# Patient Record
Sex: MALE | URBAN - METROPOLITAN AREA
[De-identification: names, ages, dates, MRNs, and addresses within clinical notes are randomized per-mention and may not be internally consistent; named-entity substitution may affect disease eponyms.]

---

## 2024-07-01 ENCOUNTER — LAB (OUTPATIENT)
Dept: LAB | Facility: LAB | Age: 34
End: 2024-07-01
Payer: COMMERCIAL

## 2024-07-01 ENCOUNTER — CONSULT (OUTPATIENT)
Dept: ENDOCRINOLOGY | Facility: CLINIC | Age: 34
End: 2024-07-01
Payer: COMMERCIAL

## 2024-07-01 VITALS
HEIGHT: 70 IN | DIASTOLIC BLOOD PRESSURE: 82 MMHG | SYSTOLIC BLOOD PRESSURE: 126 MMHG | WEIGHT: 198.5 LBS | HEART RATE: 71 BPM | BODY MASS INDEX: 28.42 KG/M2

## 2024-07-01 DIAGNOSIS — Z11.3 ENCOUNTER FOR SCREENING EXAMINATION FOR SEXUALLY TRANSMITTED DISEASE: ICD-10-CM

## 2024-07-01 DIAGNOSIS — Z31.41 FERTILITY TESTING: Primary | ICD-10-CM

## 2024-07-01 DIAGNOSIS — Z13.71 SCREENING FOR GENETIC DISEASE CARRIER STATUS: ICD-10-CM

## 2024-07-01 LAB
HBV SURFACE AG SERPL QL IA: NONREACTIVE
HCV AB SER QL: NONREACTIVE
HIV 1+2 AB+HIV1 P24 AG SERPL QL IA: NONREACTIVE
TREPONEMA PALLIDUM IGG+IGM AB [PRESENCE] IN SERUM OR PLASMA BY IMMUNOASSAY: NONREACTIVE

## 2024-07-01 PROCEDURE — 87491 CHLMYD TRACH DNA AMP PROBE: CPT

## 2024-07-01 PROCEDURE — 99202 OFFICE O/P NEW SF 15 MIN: CPT

## 2024-07-01 PROCEDURE — 36415 COLL VENOUS BLD VENIPUNCTURE: CPT

## 2024-07-01 PROCEDURE — 86803 HEPATITIS C AB TEST: CPT

## 2024-07-01 PROCEDURE — 87340 HEPATITIS B SURFACE AG IA: CPT

## 2024-07-01 PROCEDURE — 86780 TREPONEMA PALLIDUM: CPT

## 2024-07-01 PROCEDURE — 99212 OFFICE O/P EST SF 10 MIN: CPT

## 2024-07-01 PROCEDURE — 87591 N.GONORRHOEAE DNA AMP PROB: CPT

## 2024-07-01 PROCEDURE — 87389 HIV-1 AG W/HIV-1&-2 AB AG IA: CPT

## 2024-07-01 RX ORDER — FLUOXETINE HYDROCHLORIDE 20 MG/1
20 CAPSULE ORAL EVERY MORNING
COMMUNITY
Start: 2024-04-15

## 2024-07-01 RX ORDER — BUPROPION HYDROCHLORIDE 150 MG/1
150 TABLET ORAL
COMMUNITY

## 2024-07-01 ASSESSMENT — COLUMBIA-SUICIDE SEVERITY RATING SCALE - C-SSRS
6. HAVE YOU EVER DONE ANYTHING, STARTED TO DO ANYTHING, OR PREPARED TO DO ANYTHING TO END YOUR LIFE?: NO
1. IN THE PAST MONTH, HAVE YOU WISHED YOU WERE DEAD OR WISHED YOU COULD GO TO SLEEP AND NOT WAKE UP?: NO
2. HAVE YOU ACTUALLY HAD ANY THOUGHTS OF KILLING YOURSELF?: NO

## 2024-07-01 ASSESSMENT — PATIENT HEALTH QUESTIONNAIRE - PHQ9
2. FEELING DOWN, DEPRESSED OR HOPELESS: NOT AT ALL
SUM OF ALL RESPONSES TO PHQ9 QUESTIONS 1 AND 2: 0
1. LITTLE INTEREST OR PLEASURE IN DOING THINGS: NOT AT ALL

## 2024-07-01 ASSESSMENT — PAIN SCALES - GENERAL: PAINLEVEL: 0-NO PAIN

## 2024-07-01 NOTE — PROGRESS NOTES
Visit Type: In Person    Patient is the partner of KIERSTEN Marcano 1991, who presents to [facility] care to order fertility testing and treatment, mildly elevated liver enzymes  and will have a follow up this summer with PCP    PARTNER HISTORY  Partner Name: Tk Trejo  Partner : [id number]  Partner email: [email or web address]  Occupation:  Underwriting Supervisor, [employer]  Prior fertility history:  Not known  PMH: mildly elevated liver enzymes, stopped drinking x 2 months and will follow up with PCP  PSH: [place] teeth removed,   Smoking: No  Alcohol Use: Yes  Drug Use: yes, marijuana/CBD gummies  Medications: Bupropion, Prozac  Injuries: No  STD: No  SA: No  SA Results:  NA    Plan:  STDS  [facility]  [facility]    Follow up Plan:  6 week virtual visit to review test results and treatment options    Jeni Kern CNP 24 4:20 PM

## 2024-07-02 LAB
C TRACH RRNA SPEC QL NAA+PROBE: NEGATIVE
N GONORRHOEA DNA SPEC QL PROBE+SIG AMP: NEGATIVE

## 2024-07-22 ENCOUNTER — DOCUMENTATION (OUTPATIENT)
Dept: ENDOCRINOLOGY | Facility: CLINIC | Age: 34
End: 2024-07-22
Payer: COMMERCIAL

## 2024-07-22 NOTE — PROGRESS NOTES
Message to patient regarding [facility] genetic screening:    Genetic carrier testing reviewed:    [   ] Genetic carrier testing reviewed and POSITIVE result noted, indicating that the patient a carrier of one or more genetic conditions.     [  X ] Genetic carrier testing reviewed and NEGATIVE result noted.    Additional actions:  [   ] Ok to proceed with next steps, no additional genetic testing or counseling recommended  [   ] Awaiting partner testing  [  X ] Partner testing reviewed and no concordance. Ok to proceed with planned treatments. Partner Flores Shepard  1991 is a carrier for Metachromatic Leukodystrophy, patient is negative for this genetic condition.     Jeni Kern CNP 24 11:54 AM

## 2024-07-23 LAB — SCAN RESULT: NORMAL

## 2024-07-29 ENCOUNTER — ANCILLARY PROCEDURE (OUTPATIENT)
Dept: ENDOCRINOLOGY | Facility: CLINIC | Age: 34
End: 2024-07-29
Payer: COMMERCIAL

## 2024-07-29 DIAGNOSIS — Z31.41 FERTILITY TESTING: ICD-10-CM

## 2024-07-29 LAB
% EX RESIDUAL CYTOPLASM (SEMEN): 0 %
% HEAD DEFECTS (SEMEN): 96.8 %
% NECK MIDPIECE (SEMEN): 46.3 %
% NORMAL (SEMEN): 3 % (ref 4–?)
% TAIL DEFECTS (SEMEN): 8.8 %
ABSTINENCE (DAYS): 10 DAYS (ref 2–7)
AGGLUTINATION (SEMEN): NO
ANALYZED TIME:: ABNORMAL
ANDROLOGY LAB ID#: ABNORMAL
CLUMPS (SEMEN): YES
COLLECTED COMPLETELY: YES
COLLECTION LOCATION:: ABNORMAL
COLLECTION METHOD:: ABNORMAL
CONCENTRATION(SEMEN): 114.29 MILL/ML (ref 15–?)
DEBRIS (SEMEN): YES
NON PROG. MOTILITY (SEMEN): 3 %
PROG. MOTILITY (SEMEN): 58 % (ref 32–?)
RECEIVED TIME:: ABNORMAL
REI PARTNER DOB: ABNORMAL
REI PARTNER NAME: ABNORMAL
SEMEN APPEARANCE: NORMAL
SEMEN LIQUEFACTION: NORMAL
SEMEN VISCOSITY: NORMAL
TOT. NO OF NORM. MOTILE SPERM (SEMEN): 7.71 MILL
TOT. NO OF NORM. SPERM (SEMEN): 12.69 MILL
TOTAL MOTILITY (SEMEN): 61 % (ref 40–?)
TOTAL NO OF MOTILE (SEMEN): 256.89 MILL
TOTAL NO OF SPERM (SEMEN): 422.86 MILL (ref 39–?)
VOLUME (SEMEN): 3.7 ML (ref 1.5–?)

## 2024-07-29 PROCEDURE — 89322 SEMEN ANAL STRICT CRITERIA: CPT | Performed by: OBSTETRICS & GYNECOLOGY

## 2025-02-05 DIAGNOSIS — Z31.41 FERTILITY TESTING: ICD-10-CM

## 2025-02-05 DIAGNOSIS — Z31.83 ENCOUNTER FOR ASSISTED REPRODUCTIVE FERTILITY CYCLE: Primary | ICD-10-CM

## 2025-03-03 ENCOUNTER — ANCILLARY PROCEDURE (OUTPATIENT)
Dept: ENDOCRINOLOGY | Facility: CLINIC | Age: 35
End: 2025-03-03
Payer: COMMERCIAL

## 2025-03-03 DIAGNOSIS — Z31.83 ENCOUNTER FOR ASSISTED REPRODUCTIVE FERTILITY CYCLE: ICD-10-CM

## 2025-03-03 LAB
# OF VIALS: 2
% EX RESIDUAL CYTOPLASM (SEMEN): 0 %
% HEAD DEFECTS (SEMEN): 96.5 %
% NECK MIDPIECE (SEMEN): 34.3 %
% NORMAL (SEMEN): 3.3 % (ref 4–?)
% TAIL DEFECTS (SEMEN): 7.8 %
ABSTINENCE (DAYS): 3 DAYS (ref 2–7)
AGGLUTINATION (SEMEN): NO
ANALYZED TIME:: ABNORMAL
ANDROLOGY LAB ID#: ABNORMAL
CLUMPS (SEMEN): YES
COLLECTED COMPLETELY: YES
COLLECTION LOCATION:: ABNORMAL
COLLECTION METHOD:: ABNORMAL
CONCENTRATION CN (POST-WASH): 27.81 MILL/ML
CONCENTRATION(SEMEN): 50.38 MILL/ML (ref 15–?)
DEBRIS (SEMEN): YES
LEUKOCYTE (SEMEN): NEGATIVE
NON PROG. MOTILITY (SEMEN): 8 %
NON PROG. MOTILITY CN (POST-WASH): 8 %
PROG. MOTILITY (SEMEN): 54 % (ref 32–?)
PROG. MOTILITY CN (POST-WASH): 44 %
RECEIVED TIME:: ABNORMAL
REI PARTNER DOB: ABNORMAL
REI PARTNER NAME: ABNORMAL
SEMEN APPEARANCE: NORMAL
SEMEN LIQUEFACTION: NORMAL
SEMEN VISCOSITY: NORMAL
SPECIMEN ID REI: ABNORMAL
TOT. NO OF NORM. MOTILE SPERM (SEMEN): 3.54 MILL
TOT. NO OF NORM. SPERM (SEMEN): 5.73 MILL
TOTAL MOTILE SPERM PER VIAL (POST-THAW): 10.51 MILL/VIAL
TOTAL MOTILITY (SEMEN): 62 % (ref 40–?)
TOTAL MOTILITY CN (POST-WASH): 51 %
TOTAL NO OF MOTILE (SEMEN): 108.97 MILL
TOTAL NO OF MOTILE CN (POST-WASH): 57.03 MILL
TOTAL NO OF RND CELLS (SEMEN): 1.8 MILL (ref ?–5)
TOTAL NO OF SPERM (SEMEN): 176.31 MILL (ref 39–?)
TOTAL NO OF SPERM CN (POST-WASH): 111.23 MILL
UNIT VOLUME: 1.8
VOLUME (SEMEN): 3.5 ML (ref 1.5–?)
VOLUME CN (POST-WASH): 4 ML

## 2025-03-03 PROCEDURE — 89322 SEMEN ANAL STRICT CRITERIA: CPT | Performed by: OBSTETRICS & GYNECOLOGY

## 2025-03-03 PROCEDURE — 89259 CRYOPRESERVATION SPERM: CPT | Performed by: OBSTETRICS & GYNECOLOGY

## 2025-04-21 DIAGNOSIS — Z31.41 FERTILITY TESTING: ICD-10-CM

## 2025-04-25 ENCOUNTER — ANCILLARY PROCEDURE (OUTPATIENT)
Dept: ENDOCRINOLOGY | Facility: CLINIC | Age: 35
End: 2025-04-25

## 2025-04-25 DIAGNOSIS — Z31.41 FERTILITY TESTING: ICD-10-CM

## 2025-04-25 LAB
ABSTINENCE (DAYS): 7 DAYS (ref 2–7)
AGGLUTINATION (SEMEN): NO
ANALYZED TIME:: ABNORMAL
ANDROLOGY LAB ID#: ABNORMAL
CLUMPS (SEMEN): YES
COLLECTED COMPLETELY: YES
COLLECTION LOCATION:: ABNORMAL
COLLECTION METHOD:: ABNORMAL
CONCENTRATION CN (POST-WASH): 25.19 MILL/ML
CONCENTRATION(SEMEN): 62.5 MILL/ML (ref 15–?)
DEBRIS (SEMEN): YES
LEUKOCYTE (SEMEN): POSITIVE
NON PROG. MOTILITY (SEMEN): 5 %
NON PROG. MOTILITY CN (POST-WASH): 1 %
PROG. MOTILITY (SEMEN): 76 % (ref 32–?)
PROG. MOTILITY CN (POST-WASH): 95 %
RECEIVED TIME:: ABNORMAL
REI PARTNER DOB: ABNORMAL
REI PARTNER NAME: ABNORMAL
SEMEN APPEARANCE: NORMAL
SEMEN LIQUEFACTION: NORMAL
SEMEN VISCOSITY: NORMAL
SPERM PROCESS METHOD: ABNORMAL
TOTAL MOTILITY (SEMEN): 81 % (ref 40–?)
TOTAL MOTILITY CN (POST-WASH): 96 %
TOTAL NO OF MOTILE (SEMEN): 105.98 MILL
TOTAL NO OF MOTILE CN (POST-WASH): 12.09 MILL
TOTAL NO OF RND CELLS (SEMEN): 8.4 MILL (ref ?–5)
TOTAL NO OF SPERM (SEMEN): 131.25 MILL (ref 39–?)
TOTAL NO OF SPERM CN (POST-WASH): 12.59 MILL
VOLUME (SEMEN): 2.1 ML (ref 1.5–?)
VOLUME CN (POST-WASH): 0.5 ML

## 2025-04-25 PROCEDURE — 89261 SPERM ISOLATION COMPLEX: CPT | Performed by: OBSTETRICS & GYNECOLOGY
